# Patient Record
Sex: FEMALE | Race: WHITE | Employment: FULL TIME | URBAN - NONMETROPOLITAN AREA
[De-identification: names, ages, dates, MRNs, and addresses within clinical notes are randomized per-mention and may not be internally consistent; named-entity substitution may affect disease eponyms.]

---

## 2017-01-05 ENCOUNTER — OFFICE VISIT (OUTPATIENT)
Dept: FAMILY MEDICINE CLINIC | Facility: CLINIC | Age: 52
End: 2017-01-05

## 2017-01-05 VITALS
HEART RATE: 70 BPM | TEMPERATURE: 98 F | BODY MASS INDEX: 22 KG/M2 | WEIGHT: 149 LBS | SYSTOLIC BLOOD PRESSURE: 110 MMHG | DIASTOLIC BLOOD PRESSURE: 70 MMHG | RESPIRATION RATE: 16 BRPM

## 2017-01-05 DIAGNOSIS — Z00.00 ROUTINE HEALTH MAINTENANCE: ICD-10-CM

## 2017-01-05 DIAGNOSIS — Z00.00 WELL ADULT EXAM: Primary | ICD-10-CM

## 2017-01-05 LAB
ALBUMIN SERPL-MCNC: 4.3 G/DL (ref 3.5–4.8)
ALP LIVER SERPL-CCNC: 78 U/L (ref 39–100)
ALT SERPL-CCNC: 24 U/L (ref 14–54)
AST SERPL-CCNC: 17 U/L (ref 15–41)
BASOPHILS # BLD AUTO: 0.04 X10(3) UL (ref 0–0.1)
BASOPHILS NFR BLD AUTO: 1 %
BILIRUB SERPL-MCNC: 0.4 MG/DL (ref 0.1–2)
BUN BLD-MCNC: 18 MG/DL (ref 8–20)
CALCIUM BLD-MCNC: 8.6 MG/DL (ref 8.3–10.3)
CHLORIDE: 100 MMOL/L (ref 101–111)
CHOLEST SMN-MCNC: 199 MG/DL (ref ?–200)
CO2: 30 MMOL/L (ref 22–32)
CREAT BLD-MCNC: 0.85 MG/DL (ref 0.55–1.02)
EOSINOPHIL # BLD AUTO: 0.16 X10(3) UL (ref 0–0.3)
EOSINOPHIL NFR BLD AUTO: 4.2 %
ERYTHROCYTE [DISTWIDTH] IN BLOOD BY AUTOMATED COUNT: 11.3 % (ref 11.5–16)
GLUCOSE BLD-MCNC: 66 MG/DL (ref 70–99)
HCT VFR BLD AUTO: 45.3 % (ref 34–50)
HDLC SERPL-MCNC: 86 MG/DL (ref 45–?)
HDLC SERPL: 2.31 {RATIO} (ref ?–4.44)
HGB BLD-MCNC: 15.3 G/DL (ref 12–16)
IMMATURE GRANULOCYTE COUNT: 0.01 X10(3) UL (ref 0–1)
IMMATURE GRANULOCYTE RATIO %: 0.3 %
LDLC SERPL CALC-MCNC: 97 MG/DL (ref ?–130)
LYMPHOCYTES # BLD AUTO: 1.39 X10(3) UL (ref 0.9–4)
LYMPHOCYTES NFR BLD AUTO: 36.2 %
M PROTEIN MFR SERPL ELPH: 8 G/DL (ref 6.1–8.3)
MCH RBC QN AUTO: 32.8 PG (ref 27–33.2)
MCHC RBC AUTO-ENTMCNC: 33.8 G/DL (ref 31–37)
MCV RBC AUTO: 97 FL (ref 81–100)
MONOCYTES # BLD AUTO: 0.55 X10(3) UL (ref 0.1–0.6)
MONOCYTES NFR BLD AUTO: 14.3 %
NEUTROPHIL ABS PRELIM: 1.69 X10 (3) UL (ref 1.3–6.7)
NEUTROPHILS # BLD AUTO: 1.69 X10(3) UL (ref 1.3–6.7)
NEUTROPHILS NFR BLD AUTO: 44 %
NONHDLC SERPL-MCNC: 113 MG/DL (ref ?–130)
PLATELET # BLD AUTO: 240 10(3)UL (ref 150–450)
POTASSIUM SERPL-SCNC: 3.4 MMOL/L (ref 3.6–5.1)
RBC # BLD AUTO: 4.67 X10(6)UL (ref 3.8–5.1)
RED CELL DISTRIBUTION WIDTH-SD: 40.3 FL (ref 35.1–46.3)
SODIUM SERPL-SCNC: 138 MMOL/L (ref 136–144)
TRIGLYCERIDES: 81 MG/DL (ref ?–150)
VLDL: 16 MG/DL (ref 5–40)
WBC # BLD AUTO: 3.8 X10(3) UL (ref 4–13)

## 2017-01-05 PROCEDURE — 80061 LIPID PANEL: CPT | Performed by: INTERNAL MEDICINE

## 2017-01-05 PROCEDURE — 99396 PREV VISIT EST AGE 40-64: CPT | Performed by: INTERNAL MEDICINE

## 2017-01-05 PROCEDURE — 82306 VITAMIN D 25 HYDROXY: CPT | Performed by: INTERNAL MEDICINE

## 2017-01-05 PROCEDURE — 85025 COMPLETE CBC W/AUTO DIFF WBC: CPT | Performed by: INTERNAL MEDICINE

## 2017-01-05 PROCEDURE — 80053 COMPREHEN METABOLIC PANEL: CPT | Performed by: INTERNAL MEDICINE

## 2017-01-05 RX ORDER — AMOXICILLIN AND CLAVULANATE POTASSIUM 500; 125 MG/1; MG/1
1 TABLET, FILM COATED ORAL 2 TIMES DAILY
Qty: 20 TABLET | Refills: 0 | Status: SHIPPED | OUTPATIENT
Start: 2017-01-05 | End: 2017-01-15

## 2017-01-05 RX ORDER — OMEPRAZOLE 40 MG/1
40 CAPSULE, DELAYED RELEASE ORAL DAILY
Qty: 30 CAPSULE | Refills: 3 | Status: SHIPPED | OUTPATIENT
Start: 2017-01-05 | End: 2017-03-14

## 2017-01-05 NOTE — PROGRESS NOTES
HPI:   Matthew Zuniga is a 54year old female who presents for a complete physical exam. Symptoms: denies discharge, itching, burning or dysuria, is menopausal. Patient complains of nothing. She had another breast biopsy this year.   That makes 3 on the Apply topically 2 (two) times daily. (Patient taking differently: Apply topically 2 (two) times daily as needed.  ) Disp: 60 g Rfl: 2   Krill Oil 1000 MG Oral Cap Take 1 tablet by mouth daily.  Disp:  Rfl:       Past Medical History   Diagnosis Date   • Eso denies back pain  NEURO: denies headaches  PSYCHE: denies depression or anxiety  HEMATOLOGIC: denies hx of anemia  ENDOCRINE: denies thyroid history  ALL/ASTHMA: denies hx of allergy or asthma    EXAM:   /70 mmHg  Pulse 70  Temp(Src) 98 °F (36.7 °C)

## 2017-01-06 DIAGNOSIS — Z00.00 ROUTINE HEALTH MAINTENANCE: Primary | ICD-10-CM

## 2017-01-06 DIAGNOSIS — R79.89 LOW VITAMIN D LEVEL: Primary | ICD-10-CM

## 2017-01-06 LAB — 25-HYDROXYVITAMIN D (TOTAL): 28.5 NG/ML (ref 30–100)

## 2017-03-14 ENCOUNTER — TELEPHONE (OUTPATIENT)
Dept: FAMILY MEDICINE CLINIC | Facility: CLINIC | Age: 52
End: 2017-03-14

## 2017-03-14 RX ORDER — OMEPRAZOLE 40 MG/1
40 CAPSULE, DELAYED RELEASE ORAL DAILY
Qty: 90 CAPSULE | Refills: 3 | Status: SHIPPED | OUTPATIENT
Start: 2017-03-14 | End: 2018-03-01

## 2017-03-14 RX ORDER — OMEPRAZOLE 40 MG/1
40 CAPSULE, DELAYED RELEASE ORAL DAILY
Qty: 30 CAPSULE | Refills: 3 | Status: SHIPPED | OUTPATIENT
Start: 2017-03-14 | End: 2017-03-14

## 2017-03-14 RX ORDER — OMEPRAZOLE 40 MG/1
40 CAPSULE, DELAYED RELEASE ORAL DAILY
Qty: 90 CAPSULE | Refills: 3 | Status: SHIPPED | OUTPATIENT
Start: 2017-03-14 | End: 2017-03-14

## 2017-03-14 NOTE — TELEPHONE ENCOUNTER
LM for patient to [facility] 3/14/17 1025am  LM for patient to [facility] 3/21/17 1020am

## 2017-03-28 RX ORDER — ESCITALOPRAM OXALATE 20 MG/1
20 TABLET ORAL DAILY
Qty: 90 TABLET | Refills: 3 | Status: SHIPPED | OUTPATIENT
Start: 2017-03-28 | End: 2018-03-23

## 2017-04-05 ENCOUNTER — PATIENT OUTREACH (OUTPATIENT)
Dept: FAMILY MEDICINE CLINIC | Facility: CLINIC | Age: 52
End: 2017-04-05

## 2017-04-13 ENCOUNTER — NURSE ONLY (OUTPATIENT)
Dept: FAMILY MEDICINE CLINIC | Facility: CLINIC | Age: 52
End: 2017-04-13

## 2017-04-13 DIAGNOSIS — R79.89 LOW VITAMIN D LEVEL: ICD-10-CM

## 2017-04-13 PROCEDURE — 82306 VITAMIN D 25 HYDROXY: CPT | Performed by: INTERNAL MEDICINE

## 2017-04-13 PROCEDURE — 36415 COLL VENOUS BLD VENIPUNCTURE: CPT | Performed by: FAMILY MEDICINE

## 2017-04-20 ENCOUNTER — TELEPHONE (OUTPATIENT)
Dept: FAMILY MEDICINE CLINIC | Facility: CLINIC | Age: 52
End: 2017-04-20

## 2017-04-20 DIAGNOSIS — Z12.39 SCREENING FOR BREAST CANCER: Primary | ICD-10-CM

## 2017-04-20 NOTE — TELEPHONE ENCOUNTER
She had a biopsy of the right breast last year and a lumpectmy. She also has a clip in the left breast. Is this for diagnostic of both? Please place order to [facility] and call patient when complete.

## 2017-05-09 ENCOUNTER — MED REC SCAN ONLY (OUTPATIENT)
Dept: FAMILY MEDICINE CLINIC | Facility: CLINIC | Age: 52
End: 2017-05-09

## 2017-05-22 ENCOUNTER — PATIENT MESSAGE (OUTPATIENT)
Dept: FAMILY MEDICINE CLINIC | Facility: CLINIC | Age: 52
End: 2017-05-22

## 2017-05-22 NOTE — TELEPHONE ENCOUNTER
From: Jatinder Gonzales  To: Ruthy Ritter MD  Sent: 5/22/2017 7:41 AM CDT  Subject: Non-Urgent Medical Question    Would any of the blood tests I've taken indicated what my blood type is?

## 2017-05-23 ENCOUNTER — HOSPITAL ENCOUNTER (OUTPATIENT)
Dept: MAMMOGRAPHY | Age: 52
Discharge: HOME | End: 2017-05-23
Attending: INTERNAL MEDICINE
Payer: COMMERCIAL

## 2017-05-23 DIAGNOSIS — Z12.39 SCREENING FOR BREAST CANCER: ICD-10-CM

## 2017-05-23 PROCEDURE — 77067 SCR MAMMO BI INCL CAD: CPT | Performed by: INTERNAL MEDICINE

## 2017-05-23 PROCEDURE — 77063 BREAST TOMOSYNTHESIS BI: CPT | Performed by: INTERNAL MEDICINE

## 2017-06-14 ENCOUNTER — MED REC SCAN ONLY (OUTPATIENT)
Dept: FAMILY MEDICINE CLINIC | Facility: CLINIC | Age: 52
End: 2017-06-14

## 2017-06-15 ENCOUNTER — HOSPITAL ENCOUNTER (OUTPATIENT)
Dept: ULTRASOUND IMAGING | Age: 52
Discharge: HOME | End: 2017-06-15
Attending: INTERNAL MEDICINE
Payer: COMMERCIAL

## 2017-06-15 ENCOUNTER — HOSPITAL ENCOUNTER (OUTPATIENT)
Dept: MAMMOGRAPHY | Age: 52
Discharge: HOME | End: 2017-06-15
Attending: INTERNAL MEDICINE
Payer: COMMERCIAL

## 2017-06-15 DIAGNOSIS — R92.8 ABNORMAL MAMMOGRAM OF BOTH BREASTS: ICD-10-CM

## 2017-06-15 PROCEDURE — 76642 ULTRASOUND BREAST LIMITED: CPT | Performed by: INTERNAL MEDICINE

## 2017-06-15 PROCEDURE — 77062 BREAST TOMOSYNTHESIS BI: CPT | Performed by: INTERNAL MEDICINE

## 2017-06-15 PROCEDURE — 77066 DX MAMMO INCL CAD BI: CPT | Performed by: INTERNAL MEDICINE

## 2017-08-03 PROBLEM — H90.42 SENSORINEURAL HEARING LOSS, UNILATERAL, LEFT EAR, WITH UNRESTRICTED HEARING ON THE CONTRALATERAL SIDE: Status: ACTIVE | Noted: 2017-08-03

## 2017-08-24 ENCOUNTER — TELEPHONE (OUTPATIENT)
Dept: FAMILY MEDICINE CLINIC | Facility: CLINIC | Age: 52
End: 2017-08-24

## 2017-08-24 NOTE — TELEPHONE ENCOUNTER
SHANTI for patient to Cb, we received orders for ABO Rh type from [facility]. SHANTI for patient to Cb, she needs to schedule when the  is here, orders in blue Book up front.

## 2017-08-25 ENCOUNTER — TELEPHONE (OUTPATIENT)
Dept: FAMILY MEDICINE CLINIC | Facility: CLINIC | Age: 52
End: 2017-08-25

## 2017-08-25 NOTE — TELEPHONE ENCOUNTER
Received orders from [facility] via fax this morning for labs that pt is coming here to have this afternoon at 4:00. Pt pcp is Dr. Rosa Awad. Put orders in blue book in nurse area.

## 2017-08-25 NOTE — TELEPHONE ENCOUNTER
Pt tried going to [place] to have this done and was told they cannot do it. Can we please fax these order to [address] in [place] for pt to have done there?

## 2017-08-25 NOTE — TELEPHONE ENCOUNTER
After discussion with nurse's in back, we do not do this type of lab here at [place]. It can be done at the [facility] or [facility].  Called pt, explained, she understood, cancelled appt here in [place].

## 2017-08-30 ENCOUNTER — PATIENT MESSAGE (OUTPATIENT)
Dept: FAMILY MEDICINE CLINIC | Facility: CLINIC | Age: 52
End: 2017-08-30

## 2017-08-30 NOTE — TELEPHONE ENCOUNTER
From: Zaida Sheridan  To: Griffin Sheehan MD  Sent: 8/30/2017 2:12 PM CDT  Subject: Non-Urgent Medical Question    I was given a blood order typing from [facility] to be a donor. I had the office send it to [place].  [employer] is saying No

## 2017-08-31 ENCOUNTER — TELEPHONE (OUTPATIENT)
Dept: FAMILY MEDICINE CLINIC | Facility: CLINIC | Age: 52
End: 2017-08-31

## 2017-09-05 ENCOUNTER — PATIENT MESSAGE (OUTPATIENT)
Dept: FAMILY MEDICINE CLINIC | Facility: CLINIC | Age: 52
End: 2017-09-05

## 2017-09-06 NOTE — TELEPHONE ENCOUNTER
From: Glorya Peabody, RN  To: Hermann Reed  Sent: 9/5/2017 5:15 PM CDT  Subject: Lala English, we have left a couple messages on your voicemail, if you still have questions regarding lab results please call us.   Thank you,  Shani Jin

## 2017-09-07 ENCOUNTER — MED REC SCAN ONLY (OUTPATIENT)
Dept: FAMILY MEDICINE CLINIC | Facility: CLINIC | Age: 52
End: 2017-09-07

## 2017-11-08 ENCOUNTER — PATIENT MESSAGE (OUTPATIENT)
Dept: FAMILY MEDICINE CLINIC | Facility: CLINIC | Age: 52
End: 2017-11-08

## 2017-11-08 NOTE — TELEPHONE ENCOUNTER
From: Rand Ricketts  To: Teresa Sprague MD  Sent: 11/8/2017 12:40 PM CST  Subject: Non-Urgent Medical Question    Hi Doctor Robles Maria,    About mid-day I tried to eat something and I got an intense pain at the base of my throat that moved up a little.  I als

## 2017-11-27 ENCOUNTER — PATIENT MESSAGE (OUTPATIENT)
Dept: FAMILY MEDICINE CLINIC | Facility: CLINIC | Age: 52
End: 2017-11-27

## 2017-11-27 NOTE — TELEPHONE ENCOUNTER
From: Tiffanie Yates  To: Davide Aguilar MD  Sent: 11/27/2017 11:06 AM CST  Subject: Test Results Question    Hi Dr. Tiny Tai,    I was wondering if I could get a copy of the results of my last pap smear? I believe it would have been a couple of years ago.

## 2018-03-01 RX ORDER — OMEPRAZOLE 40 MG/1
CAPSULE, DELAYED RELEASE ORAL
Qty: 90 CAPSULE | Refills: 0 | Status: SHIPPED | OUTPATIENT
Start: 2018-03-01 | End: 2019-02-15

## 2018-03-01 NOTE — TELEPHONE ENCOUNTER
Last office visit 1-5-17  Last refill 3-14-17 #90 with 3  No future appointments. Left message for patient to call back. Due for office visit.

## 2018-03-14 ENCOUNTER — OFFICE VISIT (OUTPATIENT)
Dept: FAMILY MEDICINE CLINIC | Facility: CLINIC | Age: 53
End: 2018-03-14

## 2018-03-14 ENCOUNTER — LAB ENCOUNTER (OUTPATIENT)
Dept: LAB | Age: 53
End: 2018-03-14
Attending: INTERNAL MEDICINE
Payer: COMMERCIAL

## 2018-03-14 VITALS
HEIGHT: 68.25 IN | SYSTOLIC BLOOD PRESSURE: 120 MMHG | WEIGHT: 150 LBS | TEMPERATURE: 97 F | OXYGEN SATURATION: 99 % | BODY MASS INDEX: 22.73 KG/M2 | HEART RATE: 75 BPM | DIASTOLIC BLOOD PRESSURE: 80 MMHG

## 2018-03-14 DIAGNOSIS — Z00.00 WELL ADULT EXAM: ICD-10-CM

## 2018-03-14 DIAGNOSIS — F32.A ANXIETY AND DEPRESSION: ICD-10-CM

## 2018-03-14 DIAGNOSIS — Z00.00 WELL ADULT EXAM: Primary | ICD-10-CM

## 2018-03-14 DIAGNOSIS — F41.9 ANXIETY AND DEPRESSION: ICD-10-CM

## 2018-03-14 LAB
ALBUMIN SERPL-MCNC: 4.1 G/DL (ref 3.5–4.8)
ALP LIVER SERPL-CCNC: 70 U/L (ref 41–108)
ALT SERPL-CCNC: 17 U/L (ref 14–54)
AST SERPL-CCNC: 15 U/L (ref 15–41)
BASOPHILS # BLD AUTO: 0.05 X10(3) UL (ref 0–0.1)
BASOPHILS NFR BLD AUTO: 0.9 %
BILIRUB SERPL-MCNC: 0.5 MG/DL (ref 0.1–2)
BUN BLD-MCNC: 12 MG/DL (ref 8–20)
CALCIUM BLD-MCNC: 9.5 MG/DL (ref 8.3–10.3)
CHLORIDE: 104 MMOL/L (ref 101–111)
CHOLEST SMN-MCNC: 206 MG/DL (ref ?–200)
CO2: 30 MMOL/L (ref 22–32)
CREAT BLD-MCNC: 0.83 MG/DL (ref 0.55–1.02)
EOSINOPHIL # BLD AUTO: 0.06 X10(3) UL (ref 0–0.3)
EOSINOPHIL NFR BLD AUTO: 1 %
ERYTHROCYTE [DISTWIDTH] IN BLOOD BY AUTOMATED COUNT: 11.6 % (ref 11.5–16)
GLUCOSE BLD-MCNC: 82 MG/DL (ref 70–99)
HCT VFR BLD AUTO: 41.7 % (ref 34–50)
HDLC SERPL-MCNC: 83 MG/DL (ref 45–?)
HDLC SERPL: 2.48 {RATIO} (ref ?–4.44)
HGB BLD-MCNC: 13.8 G/DL (ref 12–16)
IMMATURE GRANULOCYTE COUNT: 0.01 X10(3) UL (ref 0–1)
IMMATURE GRANULOCYTE RATIO %: 0.2 %
LDLC SERPL CALC-MCNC: 105 MG/DL (ref ?–130)
LYMPHOCYTES # BLD AUTO: 1.9 X10(3) UL (ref 0.9–4)
LYMPHOCYTES NFR BLD AUTO: 33.2 %
M PROTEIN MFR SERPL ELPH: 7.5 G/DL (ref 6.1–8.3)
MCH RBC QN AUTO: 31.7 PG (ref 27–33.2)
MCHC RBC AUTO-ENTMCNC: 33.1 G/DL (ref 31–37)
MCV RBC AUTO: 95.9 FL (ref 81–100)
MONOCYTES # BLD AUTO: 0.38 X10(3) UL (ref 0.1–1)
MONOCYTES NFR BLD AUTO: 6.6 %
NEUTROPHIL ABS PRELIM: 3.32 X10 (3) UL (ref 1.3–6.7)
NEUTROPHILS # BLD AUTO: 3.32 X10(3) UL (ref 1.3–6.7)
NEUTROPHILS NFR BLD AUTO: 58.1 %
NONHDLC SERPL-MCNC: 123 MG/DL (ref ?–130)
PLATELET # BLD AUTO: 259 10(3)UL (ref 150–450)
POTASSIUM SERPL-SCNC: 4.1 MMOL/L (ref 3.6–5.1)
RBC # BLD AUTO: 4.35 X10(6)UL (ref 3.8–5.1)
RED CELL DISTRIBUTION WIDTH-SD: 40.7 FL (ref 35.1–46.3)
SODIUM SERPL-SCNC: 139 MMOL/L (ref 136–144)
TRIGL SERPL-MCNC: 88 MG/DL (ref ?–150)
VLDLC SERPL CALC-MCNC: 18 MG/DL (ref 5–40)
WBC # BLD AUTO: 5.7 X10(3) UL (ref 4–13)

## 2018-03-14 PROCEDURE — 99396 PREV VISIT EST AGE 40-64: CPT | Performed by: INTERNAL MEDICINE

## 2018-03-14 PROCEDURE — 85025 COMPLETE CBC W/AUTO DIFF WBC: CPT

## 2018-03-14 PROCEDURE — 36415 COLL VENOUS BLD VENIPUNCTURE: CPT

## 2018-03-14 PROCEDURE — 88175 CYTOPATH C/V AUTO FLUID REDO: CPT | Performed by: INTERNAL MEDICINE

## 2018-03-14 PROCEDURE — 80061 LIPID PANEL: CPT

## 2018-03-14 PROCEDURE — 80053 COMPREHEN METABOLIC PANEL: CPT

## 2018-03-14 RX ORDER — BUPROPION HYDROCHLORIDE 150 MG/1
150 TABLET ORAL DAILY
Qty: 90 TABLET | Refills: 0 | Status: SHIPPED | OUTPATIENT
Start: 2018-03-14 | End: 2018-03-14

## 2018-03-14 RX ORDER — BUPROPION HYDROCHLORIDE 150 MG/1
150 TABLET ORAL DAILY
Qty: 30 TABLET | Refills: 0 | Status: SHIPPED | OUTPATIENT
Start: 2018-03-14 | End: 2018-05-03

## 2018-03-14 NOTE — PROGRESS NOTES
HPI:   Camelia Sanchez is a 48year old female who presents for a complete physical exam. Symptoms: denies discharge, itching, burning or dysuria, is menopausal. Patient complains of pain with intercourse. She is 4 years postpartum.   Wants help with amber • Osteoarthrosis, unspecified whether generalized or localized, unspecified site       Past Surgical History:  8/2015, 5/16: LUMPECTOMY RIGHT      Comment: LCIS, radial scar  3/15: [facility] BIOPSY STEREOTACTIC NODULE 1 SITE RIGHT      Comment: Carlos  3/15: [facility] Wt 150 lb   SpO2 99%   BMI 22.64 kg/m²   Body mass index is 22.64 kg/m².    GENERAL: well developed, well nourished,in no apparent distress  SKIN: no rashes,no suspicious lesions  HEENT: atraumatic, normocephalic,ears and throat are clear  EYES:PERRLA, EOM

## 2018-04-06 PROBLEM — F33.0 MILD EPISODE OF RECURRENT MAJOR DEPRESSIVE DISORDER: Status: ACTIVE | Noted: 2018-04-06

## 2018-04-06 PROBLEM — F33.0 MILD EPISODE OF RECURRENT MAJOR DEPRESSIVE DISORDER (HCC): Status: ACTIVE | Noted: 2018-04-06

## 2018-04-06 PROBLEM — F40.10 SOCIAL ANXIETY DISORDER: Status: ACTIVE | Noted: 2018-04-06

## 2018-05-29 RX ORDER — OMEPRAZOLE 40 MG/1
CAPSULE, DELAYED RELEASE ORAL
Qty: 90 CAPSULE | Refills: 0 | Status: SHIPPED | OUTPATIENT
Start: 2018-05-29 | End: 2018-08-19

## 2018-05-29 NOTE — TELEPHONE ENCOUNTER
Last refill #90 on 3/1  Last office visit on 3/14/18  Future Appointments  Date Time Provider Valerio Martinez   6/4/2018 3:30 PM ALBER Kaur [facility] [address]

## 2018-06-18 ENCOUNTER — PATIENT MESSAGE (OUTPATIENT)
Dept: FAMILY MEDICINE CLINIC | Facility: CLINIC | Age: 53
End: 2018-06-18

## 2018-06-18 NOTE — TELEPHONE ENCOUNTER
From: Cuca Zhao  To: Promise Womack MD  Sent: 6/18/2018 10:40 AM CDT  Subject: Prescription Question    Hi Dr. Joann Ortiz,  [place] and was wondering if I could get something to calm my nerves.

## 2018-06-19 RX ORDER — ALPRAZOLAM 0.25 MG/1
0.25 TABLET ORAL EVERY 4 HOURS PRN
Qty: 10 TABLET | Refills: 0 | Status: SHIPPED | OUTPATIENT
Start: 2018-06-19 | End: 2018-12-06

## 2018-07-26 ENCOUNTER — TELEPHONE (OUTPATIENT)
Dept: FAMILY MEDICINE CLINIC | Facility: CLINIC | Age: 53
End: 2018-07-26

## 2018-07-26 DIAGNOSIS — Z12.39 SCREENING FOR BREAST CANCER: Primary | ICD-10-CM

## 2018-08-01 ENCOUNTER — HOSPITAL ENCOUNTER (OUTPATIENT)
Dept: MAMMOGRAPHY | Age: 53
Discharge: HOME | End: 2018-08-01
Attending: INTERNAL MEDICINE
Payer: COMMERCIAL

## 2018-08-01 DIAGNOSIS — Z12.39 SCREENING FOR BREAST CANCER: ICD-10-CM

## 2018-08-01 PROCEDURE — 77063 BREAST TOMOSYNTHESIS BI: CPT | Performed by: INTERNAL MEDICINE

## 2018-08-01 PROCEDURE — 77067 SCR MAMMO BI INCL CAD: CPT | Performed by: INTERNAL MEDICINE

## 2018-08-20 RX ORDER — OMEPRAZOLE 40 MG/1
CAPSULE, DELAYED RELEASE ORAL
Qty: 90 CAPSULE | Refills: 0 | Status: SHIPPED | OUTPATIENT
Start: 2018-08-20 | End: 2018-11-17

## 2018-11-17 RX ORDER — OMEPRAZOLE 40 MG/1
CAPSULE, DELAYED RELEASE ORAL
Qty: 90 CAPSULE | Refills: 0 | Status: SHIPPED | OUTPATIENT
Start: 2018-11-17 | End: 2019-01-09

## 2018-11-17 NOTE — TELEPHONE ENCOUNTER
Last refill #90 on 8/20  Last office visit on 3/14/18  Future Appointments   Date Time Provider Valerio Martinez   11/29/2018  4:20 PM Jeannine Brown MD [facility] [address] [place] [address]   12/6/2018  4:00 PM ALBER Soto [facility] [address]

## 2019-02-15 RX ORDER — OMEPRAZOLE 40 MG/1
40 CAPSULE, DELAYED RELEASE ORAL
Qty: 90 CAPSULE | Refills: 1 | Status: SHIPPED | OUTPATIENT
Start: 2019-02-15 | End: 2019-08-03

## 2019-02-25 ENCOUNTER — PATIENT MESSAGE (OUTPATIENT)
Dept: FAMILY MEDICINE CLINIC | Facility: CLINIC | Age: 54
End: 2019-02-25

## 2019-02-26 NOTE — TELEPHONE ENCOUNTER
From: Jasmin Magallanes  To: Gera Kelly MD  Sent: 2/25/2019 4:57 PM CST  Subject: Other    Is 3/20 the earliest appointment? This morning she had appointments available next week.

## 2019-03-20 ENCOUNTER — OFFICE VISIT (OUTPATIENT)
Dept: FAMILY MEDICINE CLINIC | Facility: CLINIC | Age: 54
End: 2019-03-20
Payer: COMMERCIAL

## 2019-03-20 VITALS
BODY MASS INDEX: 22.28 KG/M2 | DIASTOLIC BLOOD PRESSURE: 60 MMHG | SYSTOLIC BLOOD PRESSURE: 110 MMHG | WEIGHT: 147 LBS | HEIGHT: 68 IN | TEMPERATURE: 98 F | RESPIRATION RATE: 16 BRPM | HEART RATE: 74 BPM

## 2019-03-20 DIAGNOSIS — Z00.00 WELL ADULT EXAM: Primary | ICD-10-CM

## 2019-03-20 PROCEDURE — 99396 PREV VISIT EST AGE 40-64: CPT | Performed by: INTERNAL MEDICINE

## 2019-03-20 RX ORDER — RANITIDINE 150 MG/1
150 TABLET ORAL 2 TIMES DAILY
Qty: 60 TABLET | Refills: 3 | Status: SHIPPED | OUTPATIENT
Start: 2019-03-20 | End: 2020-05-27

## 2019-03-20 RX ORDER — BUPROPION HYDROCHLORIDE 100 MG/1
100 TABLET ORAL 2 TIMES DAILY
Refills: 1 | COMMUNITY
Start: 2019-01-27 | End: 2019-07-30

## 2019-03-20 NOTE — PROGRESS NOTES
HPI:   Bernice Duncan is a 48year old female who presents for a complete physical exam. Symptoms: is menopausal. Patient complains of abdominal pain with Omeprazole sometimes requiring zantac.  She has an EGD 3 years ago that showed non erosive esophagit Rfl: 11   Multiple Vitamins Oral Tab Take 1 tablet by mouth daily. Disp:  Rfl:    Calcium 500-125 MG-UNIT Oral Tab Take 2 tablets by mouth daily. Disp:  Rfl:    Krill Oil 1000 MG Oral Cap Take 1 tablet by mouth daily.  Disp:  Rfl:       Past Medical Histo unusual skin lesions  EYES:denies blurred vision or double vision  HEENT: denies nasal congestion, sinus pain or ST  LUNGS: denies shortness of breath with exertion  CARDIOVASCULAR: denies chest pain on exertion  GI: denies abdominal pain,denies heartburn asked to return for CPX in 1 year.

## 2019-04-08 ENCOUNTER — TELEPHONE (OUTPATIENT)
Dept: INTERNAL MEDICINE CLINIC | Facility: CLINIC | Age: 54
End: 2019-04-08

## 2019-04-08 NOTE — TELEPHONE ENCOUNTER
Rec'd call from pt I was informed was Dr. Theron Vernon pt. Upon review of chart noticed she is a pt of Dr. Hamilton Molina.       Pt stated she is to have a nuclear stress test, but insurance will not cover the dye, and that this needs to go through [employer]

## 2019-04-11 ENCOUNTER — PATIENT MESSAGE (OUTPATIENT)
Dept: FAMILY MEDICINE CLINIC | Facility: CLINIC | Age: 54
End: 2019-04-11

## 2019-04-11 NOTE — TELEPHONE ENCOUNTER
From: Jimmy Naik  To: Eder Glaser MD  Sent: 4/11/2019 9:21 AM CDT  Subject: Visit Alvaro Rodarte,    I do believe the Randitine may finally be working for the heartburn.  But know I seem to have developed a sensitivity to dairy

## 2019-04-19 ENCOUNTER — HOSPITAL ENCOUNTER (OUTPATIENT)
Dept: BONE DENSITY | Age: 54
Discharge: HOME | End: 2019-04-19
Attending: INTERNAL MEDICINE
Payer: COMMERCIAL

## 2019-04-19 DIAGNOSIS — Z00.00 WELL ADULT EXAM: ICD-10-CM

## 2019-04-19 PROCEDURE — 77080 DXA BONE DENSITY AXIAL: CPT | Performed by: INTERNAL MEDICINE

## 2019-04-23 ENCOUNTER — PATIENT OUTREACH (OUTPATIENT)
Dept: FAMILY MEDICINE CLINIC | Facility: CLINIC | Age: 54
End: 2019-04-23

## 2019-08-03 RX ORDER — OMEPRAZOLE 40 MG/1
CAPSULE, DELAYED RELEASE ORAL
Qty: 90 CAPSULE | Refills: 0 | Status: SHIPPED | OUTPATIENT
Start: 2019-08-03 | End: 2019-09-22

## 2019-09-23 RX ORDER — OMEPRAZOLE 40 MG/1
40 CAPSULE, DELAYED RELEASE ORAL
Qty: 90 CAPSULE | Refills: 0 | Status: SHIPPED | OUTPATIENT
Start: 2019-09-23

## 2019-09-23 RX ORDER — BUPROPION HYDROCHLORIDE 100 MG/1
100 TABLET ORAL 2 TIMES DAILY
Qty: 180 TABLET | Refills: 0 | Status: SHIPPED | OUTPATIENT
Start: 2019-09-23 | End: 2019-12-22

## 2019-09-26 ENCOUNTER — HOSPITAL ENCOUNTER (OUTPATIENT)
Dept: MAMMOGRAPHY | Age: 54
Discharge: HOME | End: 2019-09-26
Attending: INTERNAL MEDICINE
Payer: COMMERCIAL

## 2019-09-26 DIAGNOSIS — Z00.00 WELL ADULT EXAM: ICD-10-CM

## 2019-09-26 PROCEDURE — 77067 SCR MAMMO BI INCL CAD: CPT | Performed by: INTERNAL MEDICINE

## 2019-09-26 PROCEDURE — 77063 BREAST TOMOSYNTHESIS BI: CPT | Performed by: INTERNAL MEDICINE

## 2020-02-04 RX ORDER — BUPROPION HYDROCHLORIDE 100 MG/1
TABLET ORAL
Qty: 180 TABLET | Refills: 0 | Status: SHIPPED | OUTPATIENT
Start: 2020-02-04

## 2020-02-04 NOTE — TELEPHONE ENCOUNTER
LOV  03/20/19  LAST LAB  03/14/19  LAST RX  09/23/19 (180 tabs, 0 refill)  Next OV  No future appointments.     PROTOCOL  none

## 2020-05-27 RX ORDER — RANITIDINE 150 MG/1
150 TABLET ORAL 2 TIMES DAILY
Qty: 60 TABLET | Refills: 3 | Status: SHIPPED | OUTPATIENT
Start: 2020-05-27 | End: 2020-06-26

## 2025-04-21 ENCOUNTER — PATIENT OUTREACH (OUTPATIENT)
Dept: CASE MANAGEMENT | Age: 60
End: 2025-04-21

## (undated) NOTE — MR AVS SNAPSHOT
[address] [facility] [address]  [phone]               Thank you for choosing us for your [facility]   We are glad to serve you and happy to provide you with this You can access your MyChart to more actively manage your health care and view more details from this visit by going to [email or web address].   If you've recently had a stay at the Hospital you can access your discharge instructions in [address] by harjinder

## (undated) NOTE — MR AVS SNAPSHOT
[facility]  [address] [facility] [address]  [phone]               Thank you for choosing us for your health care visit with Reema Huertas MD.  We are glad to serve you and happy to provide you with this summary o Commonly known as:  XYZAL           Multiple Vitamins Tabs   Take 1 tablet by mouth daily. Omeprazole 40 MG Cpdr   Take 1 capsule (40 mg total) by mouth daily. triamcinolone acetonide 0.1 % Crea   Apply topically 2 (two) times daily. Visit [facility] online at  [email or web address]